# Patient Record
Sex: MALE | Race: WHITE | NOT HISPANIC OR LATINO | Employment: OTHER | URBAN - METROPOLITAN AREA
[De-identification: names, ages, dates, MRNs, and addresses within clinical notes are randomized per-mention and may not be internally consistent; named-entity substitution may affect disease eponyms.]

---

## 2017-03-03 ENCOUNTER — ALLSCRIPTS OFFICE VISIT (OUTPATIENT)
Dept: OTHER | Facility: OTHER | Age: 67
End: 2017-03-03

## 2017-05-02 ENCOUNTER — LAB REQUISITION (OUTPATIENT)
Dept: LAB | Facility: HOSPITAL | Age: 67
End: 2017-05-02
Payer: MEDICARE

## 2017-05-02 ENCOUNTER — ALLSCRIPTS OFFICE VISIT (OUTPATIENT)
Dept: OTHER | Facility: OTHER | Age: 67
End: 2017-05-02

## 2017-05-02 DIAGNOSIS — L72.9 FOLLICULAR CYST OF SKIN AND SUBCUTANEOUS TISSUE: ICD-10-CM

## 2017-05-02 PROCEDURE — 88304 TISSUE EXAM BY PATHOLOGIST: CPT | Performed by: DERMATOLOGY

## 2017-05-09 ENCOUNTER — GENERIC CONVERSION - ENCOUNTER (OUTPATIENT)
Dept: OTHER | Facility: OTHER | Age: 67
End: 2017-05-09

## 2017-05-16 ENCOUNTER — ALLSCRIPTS OFFICE VISIT (OUTPATIENT)
Dept: OTHER | Facility: OTHER | Age: 67
End: 2017-05-16

## 2017-09-07 ENCOUNTER — ALLSCRIPTS OFFICE VISIT (OUTPATIENT)
Dept: OTHER | Facility: OTHER | Age: 67
End: 2017-09-07

## 2017-10-26 NOTE — PROGRESS NOTES
Assessment  1  Seborrheic keratosis (702 19) (L82 1)   2  Screening for skin condition (V82 0) (Z13 89)   3  Rosacea (695 3) (L71 9)    Plan   · Doxycycline Hyclate 50 MG Oral Capsule; take 1 capsule daily   · MetroNIDAZOLE 0 75 % External Gel; APPLY TO ENTIRE FACE DAILY   · Follow-up visit in 6 months Evaluation and Treatment  Follow-up  Status: Complete   Done: [id number]    Discussion/Summary  Discussion Summary- [facility]:   Assessment #1: Rosacea  Care Plan:   Under good control we'll continue doxycycline daily along with the MetroGel patient to return to even try doxycycline every other day  Assessment #2: Screening for dermatologic disorders  Care Plan:   Nothing else of concern noted on complete exam sun protection recommended follow-up in 6 months  Assessment #3: Seborrheic keratosis  Care Plan:   Patient reassured these are normal growths acquire with age no treatment needed  Chief Complaint  Chief Complaint Free Text Note Form: 6 month check up  History of Present Illness  HPI: 56-year-old male presents for follow-up for rosacea and overall skin check doing well no problems noted no flareups of rosacea noted concerned regarding numerous moles because of noting several friends who developed melanoma recently      Review of Systems  Complete Male Dermatology [address] [place] [address] [address]- Est Patient:   Constitutional: Denies constitutional symptoms  Eyes: Denies eye symptoms  ENT:  denies ear symptoms, nasal symptoms, mouth or throat symptoms  Cardiovascular: Denies cardiovascular symptoms  Respiratory: Denies respiratory symptoms  Gastrointestinal: Denies gastrointestinal symptoms  Musculoskeletal: Denies musculoskeletal symptoms  Integumentary: Denies skin, hair and nail symptoms  Neurological: Denies neurologic symptoms  Psychiatric: Denies psychiatric symptoms  Endocrine: Denies endocrine symptoms  Hematologic/Lymphatic: Denies hematologic symptoms  Active Problems  1  Follicular cyst of skin (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 2) (L72 9)   2  Pruritus (698 9) (L29 9)   3  Rosacea (695 3) (L71 9)   4  Screening for skin condition (V82 0) (Z13 89)   5  Seborrheic dermatitis (690 10) (L21 9)   6  Seborrheic keratosis (702 19) (L82 1)    Past Medical History  Past Medical History Reviewed- Derm:   The past medical history was reviewed  Surgical History  1  History of Total Hip Replacement  Surgical History Reviewed [facility]- [facility]:   Surgical History reviewed      Family History  Mother    1  No pertinent family history  Family History Reviewed- Derm:   Family History was reviewed      Social History   · Former smoker (J63 59) (K27 542)  Social History Reviewed [facility]- Derm: The social history was reviewed      Current Meds   1  Alfuzosin HCl ER 10 MG Oral Tablet Extended Release 24 Hour Recorded   2  Atorvastatin Calcium 10 MG Oral Tablet Recorded   3  Calcium 5668-3983 MG-UNIT CHEW Recorded   4  Calcium 200 MG TABS Recorded   5  Celecoxib 200 MG Oral Capsule; Therapy: (Meseret Balling) to Recorded   6  Creon 70937-36513 UNIT Oral Capsule Delayed Release Particles Recorded   7  Doxycycline Hyclate 50 MG Oral Capsule; TAKE 1 CAPSULE DAILY; Therapy: 04VEU4355 to (Evaluate:66Uxi1733)  Requested for: 98LBG2591; Last   Rx:03Mar2017 Ordered   8  Doxycycline Hyclate 50 MG Oral Capsule; take 1 capsule twice a day; Therapy: 99VVK2064 to (Last Rx:10Ncx5247)  Requested for: 26DZD4516; Status:   ACTIVE - Renewal Denied Ordered   9  Ketoconazole 2 % External Shampoo; SHAMPOO HAIR/SCALP TWICE WEEKLY; Therapy: 26OYB5766 to (Abhinav Joshi)  Requested for: 67YBX9642; Last   Rx:03Mar2017 Ordered   10  Konsyl 28 3 % Oral Packet Recorded   11  Lasix 20 MG Oral Tablet; Therapy: (Recorded:03Mar2017) to Recorded   12  Lisinopril 10 MG Oral Tablet Recorded   13  Lyrica 75 MG Oral Capsule; Therapy: (Meseret Balling) to Recorded   14  MetroNIDAZOLE 0 75 % External Gel; APPLY TO ENTIRE FACE DAILY;     Therapy: 91KVO9010 to (Last Rx:03Mar2017)  Requested for: 20CKL8534 Ordered   15  Multivitamins TABS; Therapy: (Recorded:11Imb8456) to Recorded   16  Omeprazole 20 MG Oral Capsule Delayed Release Recorded   17  Testosterone Cypionate 200 MG/ML OIL Recorded   18  Viberzi TABS; Therapy: (Recorded:03Mar2017) to Recorded  Medication List Reviewed: The medication list was reviewed and updated today  Allergies  1  No Known Drug Allergies  2  Adhesive Tape    Physical Exam    Constitutional   General appearance: Appears healthy and well developed  Lymphatic   No visible disturbance  Musculoskeletal   Digits and nails: No clubbing, cyanosis or edema  Cutaneous and nail exam normal     Skin   Scalp skin texture and hair distribution: Normal skin texture on scalp, normal hair distribution  Head: Normal turgor, no rashes, no lesions  Neck: Normal turgor, no rashes, no lesions  Chest: Normal turgor, no rashes, no lesions  Abdomen: Normal turgor, no rashes, no lesions  Back: Normal turgor, no rashes, no lesions  Right upper extremity: Normal turgor, no rashes, no lesions  Left upper extremity: Normal turgor, no rashes, no lesions  Right lower extremity: Normal turgor, no rashes, no lesions  Left lower extremity: Normal turgor, no rashes, no lesions  Neuro/Psych   Alert and oriented x 3  Displays comfort and cooperation during encounterl  Affect is normal     Finding Decreased erythema on the face without papules no pustules noted this time normal keratotic papules with greasy stuck on appearance nothing else remarkable noted on complete exam       Future Appointments    Date/Time Provider Specialty Site   03/09/2018 11:15 AM ALOK Neff   Dermatology [facility]     Signatures   Electronically signed by : ALOK Cotto ; Sep  7 2017 12:47PM EST                       (Author)

## 2018-03-09 ENCOUNTER — OFFICE VISIT (OUTPATIENT)
Dept: DERMATOLOGY | Facility: CLINIC | Age: 68
End: 2018-03-09
Payer: MEDICARE

## 2018-03-09 DIAGNOSIS — Z85.828 HISTORY OF SKIN CANCER: ICD-10-CM

## 2018-03-09 DIAGNOSIS — L71.9 ROSACEA: Primary | ICD-10-CM

## 2018-03-09 DIAGNOSIS — Z13.89 SCREENING FOR SKIN CONDITION: ICD-10-CM

## 2018-03-09 DIAGNOSIS — L82.1 SEBORRHEIC KERATOSIS: ICD-10-CM

## 2018-03-09 DIAGNOSIS — L21.9 SEBORRHEIC DERMATITIS: ICD-10-CM

## 2018-03-09 PROCEDURE — 99213 OFFICE O/P EST LOW 20 MIN: CPT | Performed by: DERMATOLOGY

## 2018-03-09 RX ORDER — TESTOSTERONE CYPIONATE 200 MG/ML
INJECTION INTRAMUSCULAR
COMMUNITY

## 2018-03-09 RX ORDER — DOXYCYCLINE HYCLATE 50 MG/1
50 CAPSULE ORAL DAILY
Qty: 60 CAPSULE | Refills: 3 | Status: SHIPPED | OUTPATIENT
Start: 2018-03-09 | End: 2019-04-19 | Stop reason: SDUPTHER

## 2018-03-09 RX ORDER — SENNOSIDES 8.6 MG
650 CAPSULE ORAL EVERY 8 HOURS
COMMUNITY

## 2018-03-09 RX ORDER — LANOLIN ALCOHOL/MO/W.PET/CERES
1 CREAM (GRAM) TOPICAL
COMMUNITY

## 2018-03-09 RX ORDER — KETOCONAZOLE 20 MG/ML
1 SHAMPOO TOPICAL 2 TIMES WEEKLY
Qty: 120 ML | Refills: 5 | Status: SHIPPED | OUTPATIENT
Start: 2018-03-12 | End: 2019-05-16 | Stop reason: SDUPTHER

## 2018-03-09 RX ORDER — METRONIDAZOLE 7.5 MG/G
GEL TOPICAL DAILY
COMMUNITY
Start: 2014-07-17 | End: 2018-03-09 | Stop reason: SDUPTHER

## 2018-03-09 RX ORDER — ATORVASTATIN CALCIUM 20 MG/1
20 TABLET, FILM COATED ORAL
Refills: 0 | COMMUNITY
Start: 2018-01-24

## 2018-03-09 RX ORDER — ALFUZOSIN HYDROCHLORIDE 10 MG/1
TABLET, EXTENDED RELEASE ORAL
COMMUNITY

## 2018-03-09 RX ORDER — METRONIDAZOLE 7.5 MG/G
GEL TOPICAL DAILY
Qty: 45 G | Refills: 5 | Status: SHIPPED | OUTPATIENT
Start: 2018-03-09 | End: 2018-07-27

## 2018-03-09 RX ORDER — CELECOXIB 200 MG/1
CAPSULE ORAL
COMMUNITY

## 2018-03-09 RX ORDER — DOXYCYCLINE HYCLATE 50 MG/1
1 CAPSULE ORAL 2 TIMES DAILY
COMMUNITY
Start: 2014-05-20 | End: 2018-03-09 | Stop reason: SDUPTHER

## 2018-03-09 RX ORDER — ELUXADOLINE 100 MG/1
TABLET, FILM COATED ORAL
COMMUNITY
Start: 2018-01-23

## 2018-03-09 RX ORDER — KETOCONAZOLE 20 MG/ML
SHAMPOO TOPICAL
COMMUNITY
Start: 2014-06-26 | End: 2018-03-09 | Stop reason: SDUPTHER

## 2018-03-09 RX ORDER — FUROSEMIDE 40 MG/1
40 TABLET ORAL
COMMUNITY
Start: 2018-01-11

## 2018-03-09 RX ORDER — OMEPRAZOLE 40 MG/1
CAPSULE, DELAYED RELEASE ORAL
COMMUNITY
Start: 2017-12-29

## 2018-03-09 RX ORDER — LISINOPRIL 40 MG/1
TABLET ORAL
COMMUNITY
Start: 2018-01-23

## 2018-03-09 RX ORDER — AMOXICILLIN 500 MG/1
CAPSULE ORAL
Refills: 0 | COMMUNITY
Start: 2017-12-05

## 2018-03-09 RX ORDER — TADALAFIL 5 MG
TABLET ORAL
COMMUNITY
Start: 2018-01-03

## 2018-03-09 NOTE — PROGRESS NOTES
[address] [facility] OF [address] [facility] DERMATOLOGY  [address] [place] [address]     MRN: [id number] : 1950  Encounter: [id number]  Patient Information: Delories Goltz  Chief complaint:Six-month checkup for history of anal cancer as well as rosacea    History of present illness:  67-year-old male presents for follow-up for rosacea patient notes some increased activity with some erythema on  His face  Patient relates this to shoveling from the snow storm  No past medical history on file  No past surgical history on file  Social History   History   Alcohol use Not on file     History   Drug use: Unknown     History   Smoking Status    Not on file   Smokeless Tobacco    Not on file     No family history on file  Meds/Allergies   Allergies   Allergen Reactions    Wound Dressing Adhesive     Silver Rash       Meds:  Prior to Admission medications    Medication Sig Start Date End Date Taking?  Authorizing Provider   acetaminophen (TYLENOL) 650 mg CR tablet Take 650 mg by mouth every 8 (eight) hours   Yes Historical Provider, MD   alfuzosin (UROXATRAL) 10 mg 24 hr tablet Take by mouth   Yes Historical Provider, MD   amoxicillin (AMOXIL) 500 mg capsule take 4 capsules by mouth 1 hour prior to procedure 17  Yes Historical Provider, MD   aspirin 81 MG tablet Take 81 mg by mouth 18  Yes Historical Provider, MD   atorvastatin (LIPITOR) 20 mg tablet Take 20 mg by mouth daily at bedtime 18  Yes Historical Provider, MD   Calcium 200 MG TABS Take by mouth   Yes Historical Provider, MD   celecoxib (CeleBREX) 200 mg capsule Take by mouth   Yes Historical Provider, MD   CIALIS 5 MG tablet  1/3/18  Yes Historical Provider, MD   doxycycline hyclate (VIBRAMYCIN) 50 mg capsule Take 1 capsule by mouth 2 (two) times a day 14  Yes Historical Provider, MD   furosemide (LASIX) 40 mg tablet Take 40 mg by mouth 18  Yes Historical Provider, MD   glucosamine-chondroitin 500-400 MG tablet Take 1 tablet by mouth   Yes Historical Provider, MD   ketoconazole (NIZORAL) 2 % shampoo Apply topically 6/26/14  Yes Historical Provider, MD   lisinopril (ZESTRIL) 40 mg tablet  1/23/18  Yes Historical Provider, MD   metroNIDAZOLE (METROGEL) 0 75 % gel Apply topically daily 7/17/14  Yes Historical Provider, MD   Multiple Vitamins-Minerals (MULTIVITAMIN ADULT PO) Take by mouth   Yes Historical Provider, MD   NEEDLE, DISP, 18 G (BD DISP NEEDLES) 18G X 1-1/2" [address]  6/28/15  Yes Historical Provider, MD   NEEDLE, DISP, 22 G (BD ECLIPSE NEEDLE) 22G X 1-1/2" [address] [place] [address]  7/13/15  Yes Historical Provider, MD   omeprazole (PriLOSEC) 40 MG capsule  12/29/17  Yes Historical Provider, MD   pancrelipase, Lip-Prot-Amyl, (CREON) 24,000 units Take by mouth   Yes Historical Provider, MD   psyllium (KONSYL) 28 3 % Take by mouth   Yes Historical Provider, MD   testosterone cypionate (DEPO-TESTOSTERONE) 200 mg/mL SOLN Inject into the shoulder, thigh, or buttocks   Yes Historical Provider, MD   VIBERZI 100 MG TABS  1/23/18  Yes Historical Provider, MD       Subjective:     Review of Systems:    General: negative for - chills, fatigue, fever,  weight gain or weight loss  Psychological: negative for - anxiety, behavioral disorder, concentration difficulties, decreased libido, depression, irritability, memory difficulties, mood swings, sleep disturbances or suicidal ideation  ENT: negative for - hearing difficulties , nasal congestion, nasal discharge, oral lesions, sinus pain, sneezing, sore throat  Allergy and Immunology: negative for - hives, insect bite sensitivity,  Hematological and Lymphatic: negative for - bleeding problems, blood clots,bruising, swollen lymph nodes  Endocrine: negative for - hair pattern changes, hot flashes, malaise/lethargy, mood swings, palpitations, polydipsia/polyuria, skin changes, temperature intolerance or unexpected weight change  Respiratory: negative for - cough, hemoptysis, orthopnea, shortness of breath, or wheezing  Cardiovascular: negative for - chest pain, dyspnea on exertion, edema,  Gastrointestinal: negative for - abdominal pain, nausea/vomiting  Genito-Urinary: negative for - dysuria, incontinence, irregular/heavy menses or urinary frequency/urgency  Musculoskeletal: negative for - gait disturbance, joint pain, joint stiffness, joint swelling, muscle pain, muscular weakness  Dermatological:  As in HPI  Neurological: negative for confusion, dizziness, headaches, impaired coordination/balance, memory loss, numbness/tingling, seizures, speech problems, tremors or weakness       Objective: There were no vitals taken for this visit  Physical Exam:    General Appearance:    Alert, cooperative, no distress   Head:    Normocephalic, without obvious abnormality, atraumatic           Skin:   A full skin exam was performed including scalp, head scalp, eyes, ears, nose, lips, neck, chest, axilla, abdomen, back, buttocks, bilateral upper extremities, bilateral lower extremities, hands, feet, fingers, toes, fingernails, and toenails  Increased erythema noted on the face with scaling erythema noted on the scalp normal keratotic papules with greasy stuck on appearance nothing else atypical noted on complete exam     Assessment:     1  Screening for skin condition     2  History of skin cancer     3  Seborrheic keratosis     4  Seborrheic dermatitis     5   Rosacea           Plan:    rosacea flaring from probably sun exposure advised the importance of sun  Protection   seborrheic dermatitis still active continue same treatment with the ketoconazole shampoo  Seborrheic keratosis patient reassured these are normal growths we acquire with age no treatment needed  History of skin cancer in no recurrence nothing else atypical sunblock recommended follow-up in 6 months  Screening for dermatologic disorders nothing else of concern noted on complete exam follow-up in 6 months    Sergei Greene MD  3/9/2018,11:51 AM    Portions of the record may have been created with voice recognition software   Occasional wrong word or "sound a like" substitutions may have occurred due to the inherent limitations of voice recognition software   Read the chart carefully and recognize, using context, where substitutions have occurred

## 2018-03-09 NOTE — PATIENT INSTRUCTIONS
rosacea flaring from probably sun exposure advised the importance of sun  Protection   seborrheic dermatitis still active continue same treatment with the ketoconazole shampoo  Seborrheic keratosis patient reassured these are normal growths we acquire with age no treatment needed  History of skin cancer in no recurrence nothing else atypical sunblock recommended follow-up in 6 months  Screening for dermatologic disorders nothing else of concern noted on complete exam follow-up in 6 months

## 2018-07-16 ENCOUNTER — TELEPHONE (OUTPATIENT)
Dept: DERMATOLOGY | Facility: CLINIC | Age: 68
End: 2018-07-16

## 2018-07-27 ENCOUNTER — OFFICE VISIT (OUTPATIENT)
Dept: DERMATOLOGY | Facility: CLINIC | Age: 68
End: 2018-07-27
Payer: MEDICARE

## 2018-07-27 DIAGNOSIS — L71.9 ROSACEA: Primary | ICD-10-CM

## 2018-07-27 PROCEDURE — 99213 OFFICE O/P EST LOW 20 MIN: CPT | Performed by: DERMATOLOGY

## 2018-07-27 RX ORDER — IVERMECTIN 10 MG/G
CREAM TOPICAL DAILY
Qty: 45 G | Refills: 3 | Status: SHIPPED | OUTPATIENT
Start: 2018-07-27 | End: 2018-11-15 | Stop reason: SDUPTHER

## 2018-07-27 RX ORDER — DOXYCYCLINE 40 MG/1
40 CAPSULE ORAL EVERY MORNING
Qty: 30 CAPSULE | Refills: 3 | Status: SHIPPED | OUTPATIENT
Start: 2018-07-27 | End: 2019-04-19 | Stop reason: ALTCHOICE

## 2018-07-27 NOTE — PATIENT INSTRUCTIONS
flaring on lower dose of doxycycline will try him on or a should to see if he can tolerate this and see if this will help also switch to Soolantra cream

## 2018-07-27 NOTE — PROGRESS NOTES
[address] [facility] DERMATOLOGY  [address]  [facility] [address]  [phone]  [phone]     MRN: [id number] : 1950  Encounter: [id number]  Patient Information: Rufina Ramon  Chief complaint: rosacea recheck    History of present illness:  48-year-old male with history of rosacea presents because of recent flare up patient with history of colitis noted his colitis flared his gastroenterologist told him to try to decrease doxycycline every other day however patient  stop the doxycycline completely and flared  Patient notably has gone back to every other day but not improved  Patient has been on Metrogel for years  No past medical history on file  No past surgical history on file  Social History   History   Alcohol use Not on file     History   Drug use: Unknown     History   Smoking Status    Not on file   Smokeless Tobacco    Not on file     No family history on file  Meds/Allergies   Allergies   Allergen Reactions    Wound Dressing Adhesive     Silver Rash       Meds:  Prior to Admission medications    Medication Sig Start Date End Date Taking?  Authorizing Provider   acetaminophen (TYLENOL) 650 mg CR tablet Take 650 mg by mouth every 8 (eight) hours   Yes Historical Provider, MD   alfuzosin (UROXATRAL) 10 mg 24 hr tablet Take by mouth   Yes Historical Provider, MD   amoxicillin (AMOXIL) 500 mg capsule take 4 capsules by mouth 1 hour prior to procedure 17  Yes Historical Provider, MD   aspirin 81 MG tablet Take 81 mg by mouth 18  Yes Historical Provider, MD   atorvastatin (LIPITOR) 20 mg tablet Take 20 mg by mouth daily at bedtime 18  Yes Historical Provider, MD   Calcium 200 MG TABS Take by mouth   Yes Historical Provider, MD   celecoxib (CeleBREX) 200 mg capsule Take by mouth   Yes Historical Provider, MD   CIALIS 5 MG tablet  1/3/18  Yes Historical Provider, MD   furosemide (LASIX) 40 mg tablet Take 40 mg by mouth 18  Yes Historical Provider, MD   ketoconazole (NIZORAL) 2 % shampoo Apply 1 application topically 2 (two) times a week 3/12/18  Yes Tony Garibay MD   lisinopril (ZESTRIL) 40 mg tablet  1/23/18  Yes Historical Provider, MD   Multiple Vitamins-Minerals (MULTIVITAMIN ADULT PO) Take by mouth   Yes Historical Provider, MD   NEEDLE DISP, 18 G (BD DISP NEEDLES) 18G X 1-1/2" MISC  6/28/15  Yes Historical Provider, MD   NEEDLE, DISP, 22 G (BD ECLIPSE NEEDLE) 22G X 1-1/2" [address]  7/13/15  Yes Historical Provider, MD   omeprazole (PriLOSEC) 40 MG capsule  12/29/17  Yes Historical Provider, MD   pancrelipase, Lip-Prot-Amyl, (CREON) 24,000 units Take by mouth   Yes Historical Provider, MD   psyllium (KONSYL) 28 3 % Take by mouth   Yes Historical Provider, MD   testosterone cypionate (DEPO-TESTOSTERONE) 200 mg/mL SOLN Inject into the shoulder, thigh, or buttocks   Yes Historical Provider, MD   VIBERZI 100 MG TABS  1/23/18  Yes Historical Provider, MD   metroNIDAZOLE (METROGEL) 0 75 % gel Apply topically daily 3/9/18 7/27/18 Yes Tony Garibay MD   doxycycline (ORACEA) 40 MG capsule Take 1 capsule (40 mg total) by mouth every morning 7/27/18   Tony Garibay MD   glucosamine-chondroitin 500-400 MG tablet Take 1 tablet by mouth    Historical Provider, MD   Ivermectin (SOOLANTRA) 1 % CREA Apply topically daily 7/27/18   Tony Garibay MD       Subjective:     Review of Systems:    General: negative for - chills, fatigue, fever,  weight gain or weight loss  Psychological: negative for - anxiety, behavioral disorder, concentration difficulties, decreased libido, depression, irritability, memory difficulties, mood swings, sleep disturbances or suicidal ideation  ENT: negative for - hearing difficulties , nasal congestion, nasal discharge, oral lesions, sinus pain, sneezing, sore throat  Allergy and Immunology: negative for - hives, insect bite sensitivity,  Hematological and Lymphatic: negative for - bleeding problems, blood clots,bruising, swollen lymph nodes  Endocrine: negative for - hair pattern changes, hot flashes, malaise/lethargy, mood swings, palpitations, polydipsia/polyuria, skin changes, temperature intolerance or unexpected weight change  Respiratory: negative for - cough, hemoptysis, orthopnea, shortness of breath, or wheezing  Cardiovascular: negative for - chest pain, dyspnea on exertion, edema,  Gastrointestinal: negative for - abdominal pain, nausea/vomiting  Genito-Urinary: negative for - dysuria, incontinence, irregular/heavy menses or urinary frequency/urgency  Musculoskeletal: negative for - gait disturbance, joint pain, joint stiffness, joint swelling, muscle pain, muscular weakness  Dermatological:  As in HPI  Neurological: negative for confusion, dizziness, headaches, impaired coordination/balance, memory loss, numbness/tingling, seizures, speech problems, tremors or weakness       Objective: There were no vitals taken for this visit  Physical Exam:    General Appearance:    Alert, cooperative, no distress   Head:    Normocephalic, without obvious abnormality, atraumatic           Skin:   A full skin exam was performed including scalp, head scalp, eyes, ears, nose, lips, neck, chest, axilla, abdomen, back, buttocks, bilateral upper extremities, bilateral lower extremities, hands, feet, fingers, toes, fingernails, and toenails  Erythema papules noted on the nose     Assessment:     1   Rosacea  doxycycline (ORACEA) 40 MG capsule    Ivermectin (SOOLANTRA) 1 % CREA         Plan:   flaring on lower dose of doxycycline will try him on or a should to see if he can tolerate this and see if this will help also switch to [address] cream    Kenyon Leach MD  7/27/2018,8:55 AM    Portions of the record may have been created with voice recognition software   Occasional wrong word or "sound a like" substitutions may have occurred due to the inherent limitations of voice recognition software   Read the chart carefully and recognize, using context, where substitutions have occurred

## 2018-11-15 DIAGNOSIS — L71.9 ROSACEA: ICD-10-CM

## 2018-11-15 RX ORDER — IVERMECTIN 10 MG/G
CREAM TOPICAL DAILY
Qty: 45 G | Refills: 0 | Status: SHIPPED | OUTPATIENT
Start: 2018-11-15 | End: 2019-03-16 | Stop reason: SDUPTHER

## 2019-02-28 NOTE — RESULT NOTES
Verified Results  (1) TISSUE EXAM [id number] 03:09PM Kelli Bran Order Number: [id number]     Test Name Result Flag Reference   LAB AP CASE REPORT (Report)     Surgical Pathology Report             Case: [id number]                   Authorizing Provider: Neisha Cullen MD     Collected:      05/02/2017 1509        Pathologist:      Abdulkadir Ramos MD      Received:      05/04/2017 1033        Specimen:  Skin, Cyst/Tag/Debridement, Upper mid back   LAB AP FINAL DIAGNOSIS      A  Skin, Cyst/Tag/Debridement, Upper mid back, excision:  - Ruptured epidermal (infundibular) cyst         Interpretation performed at [facility], [address] [place] [address]  Electronically signed by Abdulkadir Ramos MD on 5/8/2017 at 6:12 PM   LAB AP SURGICAL ADDITIONAL INFORMATION (Report)     These tests were developed and their performance characteristics   determined by [facility] or [employer]  They may not be cleared or approved by the [place]  Food and   Drug Administration  The FDA has determined that such clearance or   approval is not necessary  These tests are used for clinical purposes  They should not be regarded as investigational or for research  This   laboratory has been approved by [place] 88, designated as a high-complexity   laboratory and is qualified to perform these tests  LAB AP GROSS DESCRIPTION (Report)     A  The specimen is received in formalin, labeled with the patient's name   and hospital number, and is designated upper mid back follicular cyst    The specimen consists of multiple fragments of tan-yellow soft tissue and   tan caseous material, grossly consistent with contents of a ruptured cyst,   measuring in aggregate 2 2 x 2 0 x 1 0 cm  No skin is identified grossly  Representative sections  One cassette  3 pieces      Note: The estimated total formalin fixation time based upon information   provided by the submitting clinician and the standard processing schedule   is over 72 hours   MAC   LAB AP CLINICAL INFORMATION      TW Order Number: [id number]  Follicular cyst Home

## 2019-03-16 DIAGNOSIS — L71.9 ROSACEA: ICD-10-CM

## 2019-03-18 RX ORDER — IVERMECTIN 10 MG/G
CREAM TOPICAL
Qty: 45 G | Refills: 0 | Status: SHIPPED | OUTPATIENT
Start: 2019-03-18 | End: 2019-08-11 | Stop reason: SDUPTHER

## 2019-04-19 DIAGNOSIS — L71.9 ROSACEA: ICD-10-CM

## 2019-04-19 RX ORDER — DOXYCYCLINE HYCLATE 50 MG/1
CAPSULE ORAL
Qty: 60 CAPSULE | Refills: 0 | Status: SHIPPED | OUTPATIENT
Start: 2019-04-19 | End: 2019-06-19

## 2019-05-16 DIAGNOSIS — L21.9 SEBORRHEIC DERMATITIS: ICD-10-CM

## 2019-05-16 RX ORDER — KETOCONAZOLE 20 MG/ML
SHAMPOO TOPICAL
Qty: 120 ML | Refills: 5 | Status: SHIPPED | OUTPATIENT
Start: 2019-05-16

## 2019-06-23 DIAGNOSIS — L71.9 ROSACEA: Primary | ICD-10-CM

## 2019-06-23 RX ORDER — DOXYCYCLINE HYCLATE 50 MG/1
CAPSULE ORAL
Qty: 60 CAPSULE | Refills: 0 | Status: SHIPPED | OUTPATIENT
Start: 2019-06-23 | End: 2019-08-22

## 2019-07-30 ENCOUNTER — EVALUATION (OUTPATIENT)
Dept: PHYSICAL THERAPY | Facility: CLINIC | Age: 69
End: 2019-07-30
Payer: MEDICARE

## 2019-07-30 ENCOUNTER — APPOINTMENT (OUTPATIENT)
Dept: PHYSICAL THERAPY | Facility: CLINIC | Age: 69
End: 2019-07-30
Payer: MEDICARE

## 2019-07-30 DIAGNOSIS — M25.512 ACUTE PAIN OF LEFT SHOULDER: ICD-10-CM

## 2019-07-30 DIAGNOSIS — I89.0 CHRONIC ACQUIRED LYMPHEDEMA: Primary | ICD-10-CM

## 2019-07-30 PROCEDURE — 97162 PT EVAL MOD COMPLEX 30 MIN: CPT | Performed by: PHYSICAL THERAPIST

## 2019-07-30 PROCEDURE — 97140 MANUAL THERAPY 1/> REGIONS: CPT | Performed by: PHYSICAL THERAPIST

## 2019-07-30 PROCEDURE — 97110 THERAPEUTIC EXERCISES: CPT | Performed by: PHYSICAL THERAPIST

## 2019-07-30 NOTE — LETTER
2019    Lois Bonds PA-C  [address]  [facility]    Patient: Dick Hawthorne   YOB: 1950   Date of Visit: 2019     Encounter Diagnosis     ICD-10-CM    1  Chronic acquired lymphedema I89 0    2  Acute pain of left shoulder M25 512        Dear Dr Georgiana Ivan:    Thank you for your recent referral of Dick Hawthorne  Please review the attached evaluation summary from Milad's recent visit  Please verify that you agree with the plan of care by signing the attached order  If you have any questions or concerns, please do not hesitate to call  I sincerely appreciate the opportunity to share in the care of one of your patients and hope to have another opportunity to work with you in the near future  Sincerely,    Darrius Bell, PT      Referring Provider:      I certify that I have read the below Plan of Care and certify the need for these services furnished under this plan of treatment while under my care  Lois Bonds PA-C  [address]  [address]  [facility]: [phone]          PT Evaluation     Today's date: 2019  Patient name: Dick Hawthorne  : 1950  MRN: [id number]  Referring provider: Dhara Garcia PA-C  Dx:   Encounter Diagnosis     ICD-10-CM    1  Chronic acquired lymphedema I89 0    2  Acute pain of left shoulder M25 512                   Assessment  Assessment details: Patient presents with complaints of continued knee pain 7 months p(ost TKR  He continues to have left LE lymphedema  He reports decreased knee pain with wearing compression garment all day  He reports not wanting to wear the garment in the summer with shorts  Educated on self massage starting in abdomen  He has had his garment for more than 6 months and will need a new thigh-high  Patient showed no interest in compression pump at this time  Girth measurements taken    Issued home strengthening program for his left shoulder due to complaints of pain  Patient to continue PT 1 more visit to review self-massage for edema control  Understanding of Dx/Px/POC: good   Prognosis: good    Plan  Planned therapy interventions: home exercise program and massage  Frequency: 1x week  Duration in weeks: 2        Subjective Evaluation    History of Present Illness  Mechanism of injury:  per surgeon for left TKR 7 months ago, his knee pain is caused by the lymphedema  Patient has decreased edema when wearing compression garment, but does not want to wear it during the summer  Pain  Current pain ratin  Aggravating factors: stair climbing    Treatments  Previous treatment: physical therapy  Patient Goals  Patient goals for therapy: decreased pain          Objective     Static Posture     Comments  Girth measurments taken in clinic        Flowsheet Rows      Most Recent Value   PT/OT G-Codes   Current Score  55   Projected Score  0             Precautions: LTKR      Manual              MLD 10'                                                                    Exercise Diary              HEP shoulder 15                                                                                                                                                                                                                                                                       Modalities

## 2019-07-30 NOTE — LETTER
2019    Margarita Edmonds PA-C  [address]  [email or web address]  [facility]    Patient: Divina Dewitt   YOB: 1950   Date of Visit: 2019     Encounter Diagnosis     ICD-10-CM    1  Chronic acquired lymphedema I89 0    2  Acute pain of left shoulder M25 512        Dear Dr Ki Chávez:    Thank you for your recent referral of Divina Dewitt  Please review the attached evaluation summary from Milad's recent visit  Please verify that you agree with the plan of care by signing the attached order  If you have any questions or concerns, please do not hesitate to call  I sincerely appreciate the opportunity to share in the care of one of your patients and hope to have another opportunity to work with you in the near future  Sincerely,    Jaydon Rudd, PT      Referring Provider:      I certify that I have read the below Plan of Care and certify the need for these services furnished under this plan of treatment while under my care  Margarita Edmonds PA-C  [address]  [place]  [facility]: [phone]          PT Evaluation     Today's date: 2019  Patient name: Divina Dewitt  : 1950  MRN: [id number]  Referring provider: Arlin Dykes PA-C  Dx:   Encounter Diagnosis     ICD-10-CM    1  Chronic acquired lymphedema I89 0    2  Acute pain of left shoulder M25 512                   Assessment  Assessment details: Patient presents with complaints of continued knee pain 7 months p(ost TKR  He continues to have left LE lymphedema  He reports decreased knee pain with wearing compression garment all day  He reports not wanting to wear the garment in the summer with shorts  Educated on self massage starting in abdomen  He has had his garment for more than 6 months and will need a new thigh-high  Patient showed no interest in compression pump at this time  Girth measurements taken    Issued home strengthening program for his left shoulder due to complaints of pain  Patient to continue PT 1 more visit to review self-massage for edema control  Understanding of Dx/Px/POC: good   Prognosis: good    Plan  Planned therapy interventions: home exercise program and massage  Frequency: 1x week  Duration in weeks: 2        Subjective Evaluation    History of Present Illness  Mechanism of injury:  per surgeon for left TKR 7 months ago, his knee pain is caused by the lymphedema  Patient has decreased edema when wearing compression garment, but does not want to wear it during the summer  Pain  Current pain ratin  Aggravating factors: stair climbing    Treatments  Previous treatment: physical therapy  Patient Goals  Patient goals for therapy: decreased pain          Objective     Static Posture     Comments  Girth measurments taken in clinic        Flowsheet Rows      Most Recent Value   PT/OT G-Codes   Current Score  55   Projected Score  0             Precautions: LTKR      Manual              MLD 10'                                                                    Exercise Diary              HEP shoulder 15                                                                                                                                                                                                                                                                       Modalities

## 2019-07-30 NOTE — PROGRESS NOTES
PT Evaluation     Today's date: 2019  Patient name: Dustin Mckeon  : 1950  MRN: [id number]  Referring provider: Tiago Urias PA-C  Dx:   Encounter Diagnosis     ICD-10-CM    1  Chronic acquired lymphedema I89 0    2  Acute pain of left shoulder M25 512                   Assessment  Assessment details: Patient presents with complaints of continued knee pain 7 months p(ost TKR  He continues to have left LE lymphedema  He reports decreased knee pain with wearing compression garment all day  He reports not wanting to wear the garment in the summer with shorts  Educated on self massage starting in abdomen  He has had his garment for more than 6 months and will need a new thigh-high  Patient showed no interest in compression pump at this time  Girth measurements taken  Issued home strengthening program for his left shoulder due to complaints of pain  Patient to continue PT 1 more visit to review self-massage for edema control  Understanding of Dx/Px/POC: good   Prognosis: good    Plan  Planned therapy interventions: home exercise program and massage  Frequency: 1x week  Duration in weeks: 2        Subjective Evaluation    History of Present Illness  Mechanism of injury:  per surgeon for left TKR 7 months ago, his knee pain is caused by the lymphedema  Patient has decreased edema when wearing compression garment, but does not want to wear it during the summer  Pain  Current pain ratin  Aggravating factors: stair climbing    Treatments  Previous treatment: physical therapy  Patient Goals  Patient goals for therapy: decreased pain          Objective     Static Posture     Comments  Girth measurments taken in clinic        Flowsheet Rows      Most Recent Value   PT/OT G-Codes   Current Score  55   Projected Score  0             Precautions: LTKR      Manual              MLD 10'                                                                    Exercise Diary              HEP shoulder 15 Modalities

## 2019-08-01 ENCOUNTER — TRANSCRIBE ORDERS (OUTPATIENT)
Dept: PHYSICAL THERAPY | Facility: CLINIC | Age: 69
End: 2019-08-01

## 2019-08-01 DIAGNOSIS — I89.0 CHRONIC ACQUIRED LYMPHEDEMA: Primary | ICD-10-CM

## 2019-08-06 ENCOUNTER — OFFICE VISIT (OUTPATIENT)
Dept: PHYSICAL THERAPY | Facility: CLINIC | Age: 69
End: 2019-08-06
Payer: MEDICARE

## 2019-08-06 DIAGNOSIS — I89.0 CHRONIC ACQUIRED LYMPHEDEMA: Primary | ICD-10-CM

## 2019-08-06 PROCEDURE — 97140 MANUAL THERAPY 1/> REGIONS: CPT

## 2019-08-06 NOTE — PROGRESS NOTES
Discharge Note     Today's date: 2019  Patient name: Tyler Sandoval  : 1950  MRN: [id number]  Referring provider: Armin Malcolm PA-C  Dx:   Encounter Diagnosis     ICD-10-CM    1  Chronic acquired lymphedema I89 0        Start Time: 1100  Stop Time: 1145  Total time in clinic (min): 45 minutes    Assessment: Patient returned for one more visit for patient education on self-mld  Patient deferred wanting compression pump  Patient requested three more pair of compression garments  Will order for him  Educated patient on full self-mld for patient to do at home at least once per day  Patient instructed on the importance of keeping up with wearing garments and doing self-mld to maintain lymphedema  Educated patient on wearing the garments if ever flying in airplane  Patient required a lot of cueing for proper technique for self-mld  Patient stated his leg felt better post self-mld  Instructed patient to call if he has any questions or concerns  Plan: Conferred with primary PT to discharge patient from therapy due to having all tools to manage lymphedema at home  Objective: See treatment diary below    Subjective: Patient reports he has been wearing garments again which is helping            Precautions: LTKR      Manual             MLD 10'            MLD education  39'                                                      Exercise Diary              HEP shoulder 15                                                                                                                                                                                                                                                                       Modalities

## 2019-08-09 DIAGNOSIS — L71.9 ROSACEA: ICD-10-CM

## 2019-08-11 RX ORDER — IVERMECTIN 10 MG/G
CREAM TOPICAL
Qty: 45 G | Refills: 0 | Status: SHIPPED | OUTPATIENT
Start: 2019-08-11 | End: 2019-12-05 | Stop reason: SDUPTHER

## 2019-09-05 RX ORDER — DOXYCYCLINE HYCLATE 50 MG/1
CAPSULE ORAL
Qty: 60 CAPSULE | Refills: 6 | OUTPATIENT
Start: 2019-09-05

## 2019-12-05 DIAGNOSIS — L71.9 ROSACEA: ICD-10-CM

## 2019-12-05 RX ORDER — IVERMECTIN 10 MG/G
CREAM TOPICAL
Qty: 45 G | Refills: 8 | Status: SHIPPED | OUTPATIENT
Start: 2019-12-05 | End: 2020-03-04 | Stop reason: SDUPTHER

## 2020-03-04 ENCOUNTER — OFFICE VISIT (OUTPATIENT)
Dept: DERMATOLOGY | Facility: CLINIC | Age: 70
End: 2020-03-04
Payer: COMMERCIAL

## 2020-03-04 DIAGNOSIS — L73.8 SEBACEOUS HYPERPLASIA OF FACE: ICD-10-CM

## 2020-03-04 DIAGNOSIS — L82.1 SEBORRHEIC KERATOSIS: Primary | ICD-10-CM

## 2020-03-04 DIAGNOSIS — Z85.828 HISTORY OF SKIN CANCER: ICD-10-CM

## 2020-03-04 DIAGNOSIS — L71.9 ROSACEA: ICD-10-CM

## 2020-03-04 DIAGNOSIS — Z13.89 SCREENING FOR SKIN CONDITION: ICD-10-CM

## 2020-03-04 PROCEDURE — 99213 OFFICE O/P EST LOW 20 MIN: CPT | Performed by: DERMATOLOGY

## 2020-03-04 RX ORDER — SOLIFENACIN SUCCINATE 10 MG/1
10 TABLET, FILM COATED ORAL DAILY
COMMUNITY

## 2020-03-04 RX ORDER — HYDROCHLOROTHIAZIDE 25 MG/1
25 TABLET ORAL DAILY
COMMUNITY

## 2020-03-04 RX ORDER — IVERMECTIN 10 MG/G
1 CREAM TOPICAL DAILY
Qty: 45 G | Refills: 8 | Status: SHIPPED | OUTPATIENT
Start: 2020-03-04 | End: 2021-05-17

## 2020-03-04 RX ORDER — DOXYCYCLINE HYCLATE 50 MG/1
50 CAPSULE ORAL 2 TIMES DAILY
COMMUNITY
End: 2020-09-23 | Stop reason: SDUPTHER

## 2020-03-04 NOTE — PROGRESS NOTES
[facility] DERMATOLOGY  [address] [place] [id number]  [phone]  [phone]     MRN: [id number] : 1950  Encounter: [id number]  Patient Information: Dominique Arias  Chief complaint:  Rosacea checkup and follow-up for history of skin cancer    History of present illness:  50-year-old male presents for overall skin check no specific concerns noted except for some growths he notes on his forehead patient's rosacea under good control with the use of topical ivermectin still taking doxycycline daily  No past medical history on file  No past surgical history on file  Social History   Social History     Substance and Sexual Activity   Alcohol Use Not on file     Social History     Substance and Sexual Activity   Drug Use Not on file     Social History     Tobacco Use   Smoking Status Former Smoker   Smokeless Tobacco Never Used     No family history on file  Meds/Allergies   Allergies   Allergen Reactions    Wound Dressing Adhesive     Silver Rash       Meds:  Prior to Admission medications    Medication Sig Start Date End Date Taking?  Authorizing Provider   acetaminophen (TYLENOL) 650 mg CR tablet Take 650 mg by mouth every 8 (eight) hours   Yes Historical Provider, MD   alfuzosin (UROXATRAL) 10 mg 24 hr tablet Take by mouth   Yes Historical Provider, MD   amoxicillin (AMOXIL) 500 mg capsule take 4 capsules by mouth 1 hour prior to procedure 17  Yes Historical Provider, MD   atorvastatin (LIPITOR) 20 mg tablet Take 20 mg by mouth daily at bedtime 18  Yes Historical Provider, MD   Calcium 200 MG TABS Take by mouth   Yes Historical Provider, MD   CIALIS 5 MG tablet  1/3/18  Yes Historical Provider, MD   doxycycline hyclate (VIBRAMYCIN) 50 mg capsule Take 50 mg by mouth 2 (two) times a day   Yes Historical Provider, MD   hydrochlorothiazide (HYDRODIURIL) 25 mg tablet Take 25 mg by mouth daily   Yes Historical Provider, MD   Ivermectin (SOOLANTRA) 1 % CREA APPLY TOPICALLY DAILY 12/5/19  Yes Antwon Miner MD   ketoconazole (NIZORAL) 2 % shampoo APPLY 1 APPLICATION TOPICALLY TWICE WEEKLY 5/16/19  Yes Antwon Miner MD   lisinopril (ZESTRIL) 40 mg tablet  1/23/18  Yes Historical Provider, MD   Mirabegron ER 50 MG TB24 Take by mouth   Yes Historical Provider, MD   Multiple Vitamins-Minerals (MULTIVITAMIN ADULT PO) Take by mouth   Yes Historical Provider, MD   NEEDLE DISP, 18 G (BD DISP NEEDLES) 18G X 1-1/2" MISC  6/28/15  Yes Historical Provider, MD   NEEDLE DISP, 22 G (BD ECLIPSE NEEDLE) 22G X 1-1/2" MISC  7/13/15  Yes Historical Provider, MD   omeprazole (PriLOSEC) 40 MG capsule  12/29/17  Yes Historical Provider, MD   pancrelipase, Lip-Prot-Amyl, (CREON) 24,000 units Take by mouth   Yes Historical Provider, MD   psyllium (KONSYL) 28 3 % Take by mouth   Yes Historical Provider, MD   solifenacin (VESICARE) 10 MG tablet Take 10 mg by mouth daily   Yes Historical Provider, MD   testosterone cypionate (DEPO-TESTOSTERONE) 200 mg/mL SOLN Inject into the shoulder, thigh, or buttocks   Yes Historical Provider, MD   aspirin 81 MG tablet Take 81 mg by mouth 1/11/18   Historical Provider, MD   celecoxib (CeleBREX) 200 mg capsule Take by mouth    Historical Provider, MD   furosemide (LASIX) 40 mg tablet Take 40 mg by mouth 1/11/18   Historical Provider, MD   glucosamine-chondroitin 500-400 MG tablet Take 1 tablet by mouth    Historical Provider, MD   VIBERZI 100 MG TABS  1/23/18   Historical Provider, MD       Subjective:     Review of Systems:    General: negative for - chills, fatigue, fever,  weight gain or weight loss  Psychological: negative for - anxiety, behavioral disorder, concentration difficulties, decreased libido, depression, irritability, memory difficulties, mood swings, sleep disturbances or suicidal ideation  ENT: negative for - hearing difficulties , nasal congestion, nasal discharge, oral lesions, sinus pain, sneezing, sore throat  Allergy and Immunology: negative for - hives, insect bite sensitivity,  Hematological and Lymphatic: negative for - bleeding problems, blood clots,bruising, swollen lymph nodes  Endocrine: negative for - hair pattern changes, hot flashes, malaise/lethargy, mood swings, palpitations, polydipsia/polyuria, skin changes, temperature intolerance or unexpected weight change  Respiratory: negative for - cough, hemoptysis, orthopnea, shortness of breath, or wheezing  Cardiovascular: negative for - chest pain, dyspnea on exertion, edema,  Gastrointestinal: negative for - abdominal pain, nausea/vomiting  Genito-Urinary: negative for - dysuria, incontinence, irregular/heavy menses or urinary frequency/urgency  Musculoskeletal: negative for - gait disturbance, joint pain, joint stiffness, joint swelling, muscle pain, muscular weakness  Dermatological:  As in HPI  Neurological: negative for confusion, dizziness, headaches, impaired coordination/balance, memory loss, numbness/tingling, seizures, speech problems, tremors or weakness       Objective: There were no vitals taken for this visit  Physical Exam:    General Appearance:    Alert, cooperative, no distress   Head:    Normocephalic, without obvious abnormality, atraumatic   Lymphatics:     Abdomen:    Skin:   A full skin exam was performed including scalp, head scalp, eyes, ears, nose, lips, neck, chest, axilla, abdomen, back, buttocks, bilateral upper extremities, bilateral lower extremities, hands, feet, fingers, toes, fingernails, and toenails yellowish umbilicated papules noted on the forehead no active rosacea noted normal keratotic papules greasy stuck on appearance previous sites of skin cancer well healed without recurrence nothing else atypical noted exam     Assessment:     1  Seborrheic keratosis     2  Screening for skin condition     3  History of skin cancer     4   Rosacea  Ivermectin (Soolantra) 1 % CREA   5  Sebaceous hyperplasia of face           Plan:   Sebaceous hyperplasia patient advise that these are normal growths no treatment indicated  Rosacea under good control patient to decrease his doxycycline every other day continue the topical ivermectin hopefully we get him off systemic therapy  Seborrheic keratosis patient reassured these are normal growths we acquire with age no treatment needed  History of skin cancer in no recurrence nothing else atypical sunblock recommended follow-up in 6 months  Screening for dermatologic disorders nothing else of concern noted on complete exam follow-up in 6 months    Fatmata Lyle MD  3/4/2020,11:09 AM    Portions of the record may have been created with voice recognition software   Occasional wrong word or "sound a like" substitutions may have occurred due to the inherent limitations of voice recognition software   Read the chart carefully and recognize, using context, where substitutions have occurred

## 2020-03-04 NOTE — PATIENT INSTRUCTIONS
Sebaceous hyperplasia patient advise that these are normal growths no treatment indicated  Rosacea under good control patient to decrease his doxycycline every other day continue the topical ivermectin hopefully we get him off systemic therapy  Seborrheic keratosis patient reassured these are normal growths we acquire with age no treatment needed  History of skin cancer in no recurrence nothing else atypical sunblock recommended follow-up in 6 months  Screening for dermatologic disorders nothing else of concern noted on complete exam follow-up in 6 months

## 2020-03-16 ENCOUNTER — TELEPHONE (OUTPATIENT)
Dept: DERMATOLOGY | Facility: CLINIC | Age: 70
End: 2020-03-16

## 2020-03-16 NOTE — TELEPHONE ENCOUNTER
Patient called and said that the [facility] needed a prior authorization  I called the insurance company and medication was approved  Patient notified

## 2020-06-18 ENCOUNTER — TELEPHONE (OUTPATIENT)
Dept: DERMATOLOGY | Facility: CLINIC | Age: 70
End: 2020-06-18

## 2020-09-23 ENCOUNTER — OFFICE VISIT (OUTPATIENT)
Dept: DERMATOLOGY | Facility: CLINIC | Age: 70
End: 2020-09-23
Payer: COMMERCIAL

## 2020-09-23 VITALS — TEMPERATURE: 97.8 F

## 2020-09-23 DIAGNOSIS — Z13.89 SCREENING FOR SKIN CONDITION: ICD-10-CM

## 2020-09-23 DIAGNOSIS — L71.9 ROSACEA: Primary | ICD-10-CM

## 2020-09-23 DIAGNOSIS — L82.1 SEBORRHEIC KERATOSIS: ICD-10-CM

## 2020-09-23 PROCEDURE — 99213 OFFICE O/P EST LOW 20 MIN: CPT | Performed by: DERMATOLOGY

## 2020-09-23 RX ORDER — TEMAZEPAM 15 MG/1
15 CAPSULE ORAL
COMMUNITY

## 2020-09-23 RX ORDER — ALLOPURINOL 300 MG/1
300 TABLET ORAL DAILY
COMMUNITY
Start: 2020-07-09

## 2020-09-23 RX ORDER — DOXYCYCLINE HYCLATE 50 MG/1
50 CAPSULE ORAL DAILY
Qty: 90 CAPSULE | Refills: 3 | Status: SHIPPED | OUTPATIENT
Start: 2020-09-23 | End: 2021-11-17

## 2020-09-23 RX ORDER — LOSARTAN POTASSIUM 100 MG/1
100 TABLET ORAL DAILY
COMMUNITY
Start: 2020-09-14 | End: 2022-03-14

## 2020-09-23 NOTE — PROGRESS NOTES
500 [facility] DERMATOLOGY  [address] [place] [id number]  [phone]  [phone]     MRN: [id number] : 1950  Encounter: [id number]  Patient Information: Carlette Landau  Chief complaint:  Follow-up Rosacea and six-month checkup    History of present illness:  58-year-old male presents for follow-up for rosacea patient also concerned regarding some increase lesions patient without previous history of skin cancer no other specific concerns noted  History reviewed  No pertinent past medical history  History reviewed  No pertinent surgical history  Social History   Social History     Substance and Sexual Activity   Alcohol Use None     Social History     Substance and Sexual Activity   Drug Use Not on file     Social History     Tobacco Use   Smoking Status Former Smoker   Smokeless Tobacco Never Used     History reviewed  No pertinent family history  Meds/Allergies   Allergies   Allergen Reactions    Wound Dressing Adhesive     Silver Rash       Meds:  Prior to Admission medications    Medication Sig Start Date End Date Taking?  Authorizing Provider   alfuzosin (UROXATRAL) 10 mg 24 hr tablet Take by mouth   Yes Historical Provider, MD   allopurinol (ZYLOPRIM) 300 mg tablet Take 300 mg by mouth daily 20  Yes Historical Provider, MD   atorvastatin (LIPITOR) 20 mg tablet Take 20 mg by mouth daily at bedtime 18  Yes Historical Provider, MD   Calcium 200 MG TABS Take by mouth   Yes Historical Provider, MD   CIALIS 5 MG tablet  1/3/18  Yes Historical Provider, MD   doxycycline hyclate (VIBRAMYCIN) 50 mg capsule Take 50 mg by mouth 2 (two) times a day   Yes Historical Provider, MD   hydrochlorothiazide (HYDRODIURIL) 25 mg tablet Take 25 mg by mouth daily   Yes Historical Provider, MD   Ivermectin (Soolantra) 1 % CREA Apply 1 application topically daily 3/4/20  Yes Jeremie Marie MD   ketoconazole (NIZORAL) 2 % shampoo APPLY 1 APPLICATION TOPICALLY TWICE WEEKLY 5/16/19  Yes Monika Giron MD   Mirabegron ER 50 MG TB24 Take by mouth   Yes Historical Provider, MD   Multiple Vitamins-Minerals (MULTIVITAMIN ADULT PO) Take by mouth   Yes Historical Provider, MD   omeprazole (PriLOSEC) 40 MG capsule  12/29/17  Yes Historical Provider, MD   psyllium (KONSYL) 28 3 % Take by mouth   Yes Historical Provider, MD   solifenacin (VESICARE) 10 MG tablet Take 10 mg by mouth daily   Yes Historical Provider, MD   temazepam (RESTORIL) 15 mg capsule Take 15 mg by mouth daily at bedtime as needed for sleep   Yes Historical Provider, MD   testosterone cypionate (DEPO-TESTOSTERONE) 200 mg/mL SOLN Inject into the shoulder, thigh, or buttocks   Yes Historical Provider, MD   acetaminophen (TYLENOL) 650 mg CR tablet Take 650 mg by mouth every 8 (eight) hours    Historical Provider, MD   amoxicillin (AMOXIL) 500 mg capsule take 4 capsules by mouth 1 hour prior to procedure 12/5/17   Historical Provider, MD   aspirin 81 MG tablet Take 81 mg by mouth 1/11/18   Historical Provider, MD   celecoxib (CeleBREX) 200 mg capsule Take by mouth    Historical Provider, MD   furosemide (LASIX) 40 mg tablet Take 40 mg by mouth 1/11/18   Historical Provider, MD   glucosamine-chondroitin 500-400 MG tablet Take 1 tablet by mouth    Historical Provider, MD   lisinopril (ZESTRIL) 40 mg tablet  1/23/18   Historical Provider, MD   losartan (COZAAR) 100 MG tablet Take 100 mg by mouth daily 9/14/20 9/14/21  Historical Provider, MD   NEEDLE, DISP, 18 G (BD DISP NEEDLES) 18G X 1-1/2" [address] [place] [address]  6/28/15   Historical Provider, MD   NEEDLE, DISP, 22 G (BD ECLIPSE NEEDLE) 22G X 1-1/2" [address] [place] [address]  7/13/15   Historical Provider, MD   pancrelipase, Lip-Prot-Amyl, (CREON) 24,000 units Take by mouth    Historical Provider, MD   VIBERZI 100 MG TABS  1/23/18   Historical Provider, MD       Subjective:     Review of Systems:    General: negative for - chills, fatigue, fever,  weight gain or weight loss  Psychological: negative for - anxiety, behavioral disorder, concentration difficulties, decreased libido, depression, irritability, memory difficulties, mood swings, sleep disturbances or suicidal ideation  ENT: negative for - hearing difficulties , nasal congestion, nasal discharge, oral lesions, sinus pain, sneezing, sore throat  Allergy and Immunology: negative for - hives, insect bite sensitivity,  Hematological and Lymphatic: negative for - bleeding problems, blood clots,bruising, swollen lymph nodes  Endocrine: negative for - hair pattern changes, hot flashes, malaise/lethargy, mood swings, palpitations, polydipsia/polyuria, skin changes, temperature intolerance or unexpected weight change  Respiratory: negative for - cough, hemoptysis, orthopnea, shortness of breath, or wheezing  Cardiovascular: negative for - chest pain, dyspnea on exertion, edema,  Gastrointestinal: negative for - abdominal pain, nausea/vomiting  Genito-Urinary: negative for - dysuria, incontinence, irregular/heavy menses or urinary frequency/urgency  Musculoskeletal: negative for - gait disturbance, joint pain, joint stiffness, joint swelling, muscle pain, muscular weakness  Dermatological:  As in HPI  Neurological: negative for confusion, dizziness, headaches, impaired coordination/balance, memory loss, numbness/tingling, seizures, speech problems, tremors or weakness       Objective:   Temp 97 8 °F (36 6 °C)     Physical Exam:    General Appearance:    Alert, cooperative, no distress   Head:    Normocephalic, without obvious abnormality, atraumatic           Skin:   A full skin exam was performed including scalp, head scalp, eyes, ears, nose, lips, neck, chest, axilla, abdomen, back, buttocks, bilateral upper extremities, bilateral lower extremities, hands, feet, fingers, toes, fingernails, and toenails facial erythema with papules especially on the nose normal keratotic papules greasy stuck appearance nothing else atypical noted on exam     Assessment:     1  Rosacea     2  Seborrheic keratosis     3  Screening for skin condition           Plan:   Rosacea appears more active will go ahead increase the doxycycline to daily continue with the ivermectin topically as well  Will recheck in 6 months   Seborrheic Keratosis  Patient reasurred these are normal growths we acquire with age no treatment needed  Screening for Dermatologic Disorders: Nothing else of concern noted on complete exam follow up in 1 year       Savanna Cruz MD  9/23/2020,10:13 AM    Portions of the record may have been created with voice recognition software   Occasional wrong word or "sound a like" substitutions may have occurred due to the inherent limitations of voice recognition software   Read the chart carefully and recognize, using context, where substitutions have occurred

## 2020-09-23 NOTE — PATIENT INSTRUCTIONS
Rosacea appears more active will go ahead increase the doxycycline to daily continue with the ivermectin topically as well  Will recheck in 6 months   Seborrheic Keratosis  Patient reasurred these are normal growths we acquire with age no treatment needed    Screening for Dermatologic Disorders: Nothing else of concern noted on complete exam follow up in 1 year

## 2021-05-14 DIAGNOSIS — L71.9 ROSACEA: ICD-10-CM

## 2021-05-17 DIAGNOSIS — L71.9 ROSACEA: ICD-10-CM

## 2021-05-17 RX ORDER — IVERMECTIN 10 MG/G
CREAM TOPICAL
Qty: 45 G | Refills: 6 | Status: SHIPPED | OUTPATIENT
Start: 2021-05-17 | End: 2021-05-17

## 2021-05-17 RX ORDER — AZELAIC ACID 0.15 G/G
GEL TOPICAL
Qty: 30 G | Refills: 0 | Status: SHIPPED | OUTPATIENT
Start: 2021-05-17 | End: 2021-05-18 | Stop reason: SDUPTHER

## 2021-05-18 DIAGNOSIS — L71.9 ROSACEA: ICD-10-CM

## 2021-05-18 RX ORDER — AZELAIC ACID 0.15 G/G
GEL TOPICAL
Qty: 50 G | Refills: 3 | Status: SHIPPED | OUTPATIENT
Start: 2021-05-18 | End: 2022-03-14 | Stop reason: SDUPTHER

## 2021-09-13 ENCOUNTER — OFFICE VISIT (OUTPATIENT)
Dept: DERMATOLOGY | Facility: CLINIC | Age: 71
End: 2021-09-13
Payer: COMMERCIAL

## 2021-09-13 DIAGNOSIS — Z85.828 HISTORY OF SKIN CANCER: ICD-10-CM

## 2021-09-13 DIAGNOSIS — Z13.89 SCREENING FOR SKIN CONDITION: ICD-10-CM

## 2021-09-13 DIAGNOSIS — L71.9 ROSACEA: Primary | ICD-10-CM

## 2021-09-13 DIAGNOSIS — L30.4 INTERTRIGO: ICD-10-CM

## 2021-09-13 DIAGNOSIS — L82.1 SEBORRHEIC KERATOSIS: ICD-10-CM

## 2021-09-13 PROCEDURE — 99214 OFFICE O/P EST MOD 30 MIN: CPT | Performed by: DERMATOLOGY

## 2021-09-13 RX ORDER — CLOTRIMAZOLE AND BETAMETHASONE DIPROPIONATE 10; .64 MG/G; MG/G
1 CREAM TOPICAL 2 TIMES DAILY
COMMUNITY
Start: 2021-07-27

## 2021-09-13 RX ORDER — TACROLIMUS 1 MG/G
OINTMENT TOPICAL 2 TIMES DAILY
Qty: 60 G | Refills: 3 | Status: SHIPPED | OUTPATIENT
Start: 2021-09-13

## 2021-09-13 NOTE — PROGRESS NOTES
500 [facility] DERMATOLOGY  [address] [place] [address]  Natali Doctor [place] [address]  [phone]  [phone]     MRN: [id number] : 1950  Encounter: [id number]  Patient Information: Elver Murillo  Chief complaint:  Follow-up for rosacea and rash in groin area  History of present illness:  69-year-old male presents for concerns regarding irritation itching in the groin area as well as rosacea patient notes that he had a flare up with a large pimple which he felt was infected increase his doxycycline to 2 a day and seems to have improved also concerned regarding rash in the groin area he has been using generic Lotrisone cream on the area with minimal effect no other concerns noted  History reviewed  No pertinent past medical history  History reviewed  No pertinent surgical history  Social History   Social History     Substance and Sexual Activity   Alcohol Use None     Social History     Substance and Sexual Activity   Drug Use Not on file     Social History     Tobacco Use   Smoking Status Former Smoker   Smokeless Tobacco Never Used     History reviewed  No pertinent family history  Meds/Allergies   Allergies   Allergen Reactions    Wound Dressing Adhesive     Silver Rash       Meds:  Prior to Admission medications    Medication Sig Start Date End Date Taking?  Authorizing Provider   alfuzosin (UROXATRAL) 10 mg 24 hr tablet Take by mouth   Yes Historical Provider, MD   allopurinol (ZYLOPRIM) 300 mg tablet Take 300 mg by mouth daily 20  Yes Historical Provider, MD   atorvastatin (LIPITOR) 20 mg tablet Take 20 mg by mouth daily at bedtime 18  Yes Historical Provider, MD   Azelaic Acid 15 % cream Apply to face daily 21  Yes Maria Alejandra Ge MD   Calcium 200 MG TABS Take by mouth   Yes Historical Provider, MD   doxycycline hyclate (VIBRAMYCIN) 50 mg capsule Take 1 capsule (50 mg total) by mouth daily 20 Yes Maria Alejandra Ge MD   furosemide (LASIX) 40 mg tablet Take 40 mg by mouth 1/11/18  Yes Historical Provider, MD   ketoconazole (NIZORAL) 2 % shampoo APPLY 1 APPLICATION TOPICALLY TWICE WEEKLY 5/16/19  Yes Giuliana Olvera MD   losartan (COZAAR) 100 MG tablet Take 100 mg by mouth daily 9/14/20 9/14/21 Yes Historical Provider, MD   Multiple Vitamins-Minerals (MULTIVITAMIN ADULT PO) Take by mouth   Yes Historical Provider, MD   omeprazole (PriLOSEC) 40 MG capsule  12/29/17  Yes Historical Provider, MD   psyllium (KONSYL) 28 3 % Take by mouth   Yes Historical Provider, MD   solifenacin (VESICARE) 10 MG tablet Take 10 mg by mouth daily   Yes Historical Provider, MD   temazepam (RESTORIL) 15 mg capsule Take 15 mg by mouth daily at bedtime as needed for sleep   Yes Historical Provider, MD   testosterone cypionate (DEPO-TESTOSTERONE) 200 mg/mL SOLN Inject into the shoulder, thigh, or buttocks   Yes Historical Provider, MD   acetaminophen (TYLENOL) 650 mg CR tablet Take 650 mg by mouth every 8 (eight) hours  Patient not taking: Reported on 9/13/2021    Historical Provider, MD   amoxicillin (AMOXIL) 500 mg capsule take 4 capsules by mouth 1 hour prior to procedure  Patient not taking: Reported on 9/13/2021 12/5/17   Historical Provider, MD   aspirin 81 MG tablet Take 81 mg by mouth  Patient not taking: Reported on 9/13/2021 1/11/18   Historical Provider, MD   celecoxib (CeleBREX) 200 mg capsule Take by mouth  Patient not taking: Reported on 9/13/2021    Historical Provider, MD   CIALIS 5 MG tablet  1/3/18   Historical Provider, MD   clotrimazole-betamethasone (Nancy Starlight) 1-0 05 % cream Apply 1 application topically 2 (two) times a day  Patient not taking: Reported on 9/13/2021 7/27/21   Historical Provider, MD   glucosamine-chondroitin 500-400 MG tablet Take 1 tablet by mouth  Patient not taking: Reported on 9/13/2021    Historical Provider, MD   hydrochlorothiazide (HYDRODIURIL) 25 mg tablet Take 25 mg by mouth daily  Patient not taking: Reported on 9/13/2021    Historical Provider, MD lisinopril (ZESTRIL) 40 mg tablet  1/23/18   Historical Provider, MD   Mirabegron ER 50 MG TB24 Take by mouth  Patient not taking: Reported on 9/13/2021    Historical Provider, MD   NEEDLE, DISP, 18 G (BD DISP NEEDLES) 18G X 1-1/2" [address]  6/28/15   Historical Provider, MD   NEEDLE DISP, 22 G (BD ECLIPSE NEEDLE) 22G X 1-1/2" [address]  7/13/15   Historical Provider, MD   pancrelipase, Lip-Prot-Amyl, (CREON) 24,000 units Take by mouth  Patient not taking: Reported on 9/13/2021    Historical Provider, MD   VIBERZI 100 MG TABS  1/23/18   Historical Provider, MD       Subjective:     Review of Systems:    General: negative for - chills, fatigue, fever,  weight gain or weight loss  Psychological: negative for - anxiety, behavioral disorder, concentration difficulties, decreased libido, depression, irritability, memory difficulties, mood swings, sleep disturbances or suicidal ideation  ENT: negative for - hearing difficulties , nasal congestion, nasal discharge, oral lesions, sinus pain, sneezing, sore throat  Allergy and Immunology: negative for - hives, insect bite sensitivity,  Hematological and Lymphatic: negative for - bleeding problems, blood clots,bruising, swollen lymph nodes  Endocrine: negative for - hair pattern changes, hot flashes, malaise/lethargy, mood swings, palpitations, polydipsia/polyuria, skin changes, temperature intolerance or unexpected weight change  Respiratory: negative for - cough, hemoptysis, orthopnea, shortness of breath, or wheezing  Cardiovascular: negative for - chest pain, dyspnea on exertion, edema,  Gastrointestinal: negative for - abdominal pain, nausea/vomiting  Genito-Urinary: negative for - dysuria, incontinence, irregular/heavy menses or urinary frequency/urgency  Musculoskeletal: negative for - gait disturbance, joint pain, joint stiffness, joint swelling, muscle pain, muscular weakness  Dermatological:  As in HPI  Neurological: negative for confusion, dizziness, headaches, impaired coordination/balance, memory loss, numbness/tingling, seizures, speech problems, tremors or weakness       Objective: There were no vitals taken for this visit  Physical Exam:    General Appearance:    Alert, cooperative, no distress   Head:    Normocephalic, without obvious abnormality, atraumatic           Skin:   A full skin exam was performed including scalp, head scalp, eyes, ears, nose, lips, neck, chest, axilla, abdomen, back, buttocks, bilateral upper extremities, bilateral lower extremities, hands, feet, fingers, toes, fingernails, and toenails erythema occasional papules on the nose and face overall improved at this time erythema scaling noted in the groin area no distinct rash at this point but there appears to be some atrophy  Normal keratotic papules greasy stuck on appearance previous sites skin cancer well healed without recurrence nothing else atypical noted on exam     Assessment:     1  Rosacea     2  Intertrigo     3  Seborrheic keratosis     4  Screening for skin condition     5  History of skin cancer           Plan:   Rosacea at present under control but obviously patient did have a flare up will continue same treatment at this point re-evaluate if it continues to be an issue  Intertrigo difficult at this point to see if there is any fungal process there is definitely atrophy from use of chronic steroids will go ahead and switch him over to tacrolimus ointment to the area twice daily  Seborrheic keratosis patient reassured these are normal growths we acquire with age no treatment needed  History of skin cancer in no recurrence nothing else atypical sunblock recommended follow-up in 1 year  Screening for dermatologic disorders nothing else of concern noted on complete exam follow-up in 1 year          Nessa Barber MD  9/13/2021,2:58 PM    Portions of the record may have been created with voice recognition software   Occasional wrong word or "sound a like" substitutions may have occurred due to the inherent limitations of voice recognition software   Read the chart carefully and recognize, using context, where substitutions have occurred

## 2021-11-17 DIAGNOSIS — L71.9 ROSACEA: ICD-10-CM

## 2021-11-17 RX ORDER — DOXYCYCLINE HYCLATE 50 MG/1
CAPSULE ORAL
Qty: 90 CAPSULE | Refills: 3 | Status: SHIPPED | OUTPATIENT
Start: 2021-11-17 | End: 2022-11-12

## 2022-03-14 ENCOUNTER — OFFICE VISIT (OUTPATIENT)
Dept: DERMATOLOGY | Facility: CLINIC | Age: 72
End: 2022-03-14
Payer: COMMERCIAL

## 2022-03-14 VITALS — TEMPERATURE: 98.1 F | WEIGHT: 231.4 LBS

## 2022-03-14 DIAGNOSIS — Z13.89 SCREENING FOR SKIN CONDITION: ICD-10-CM

## 2022-03-14 DIAGNOSIS — L82.1 SEBORRHEIC KERATOSIS: ICD-10-CM

## 2022-03-14 DIAGNOSIS — L71.9 ROSACEA: Primary | ICD-10-CM

## 2022-03-14 DIAGNOSIS — Z85.828 HISTORY OF SKIN CANCER: ICD-10-CM

## 2022-03-14 PROCEDURE — 99213 OFFICE O/P EST LOW 20 MIN: CPT | Performed by: DERMATOLOGY

## 2022-03-14 RX ORDER — AZELAIC ACID 0.15 G/G
GEL TOPICAL
Qty: 50 G | Refills: 3 | Status: SHIPPED | OUTPATIENT
Start: 2022-03-14

## 2022-03-14 NOTE — PROGRESS NOTES
500 [facility] DERMATOLOGY  [address] [place] [id number]  [phone]  [phone]     MRN: [id number] : 1950  Encounter: [id number]  Patient Information: Mimi Cano  Chief complaint:  Six-month checkup    History of present illness:  23-year-old male presents for overall skin check history of rosacea also history of skin cancer patient recently in the [facility]  And notes some sunburn  No specific concerns today his rosacea under good control  History reviewed  No pertinent past medical history  History reviewed  No pertinent surgical history  Social History   Social History     Substance and Sexual Activity   Alcohol Use None     Social History     Substance and Sexual Activity   Drug Use Not on file     Social History     Tobacco Use   Smoking Status Former Smoker   Smokeless Tobacco Never Used     History reviewed  No pertinent family history  Meds/Allergies   Allergies   Allergen Reactions    Wound Dressing Adhesive     Silver Rash       Meds:  Prior to Admission medications    Medication Sig Start Date End Date Taking?  Authorizing Provider   alfuzosin (UROXATRAL) 10 mg 24 hr tablet Take by mouth   Yes Historical Provider, MD   allopurinol (ZYLOPRIM) 300 mg tablet Take 300 mg by mouth daily 20  Yes Historical Provider, MD   atorvastatin (LIPITOR) 20 mg tablet Take 20 mg by mouth daily at bedtime 18  Yes Historical Provider, MD   Azelaic Acid 15 % cream Apply to face daily 21  Yes Aden Meza MD   Calcium 200 MG TABS Take by mouth   Yes Historical Provider, MD   doxycycline hyclate (VIBRAMYCIN) 50 mg capsule take 1 capsule by mouth once daily 21 Yes Aden Meza MD   furosemide (LASIX) 40 mg tablet Take 40 mg by mouth 18  Yes Historical Provider, MD   ketoconazole (NIZORAL) 2 % shampoo APPLY 1 APPLICATION TOPICALLY TWICE WEEKLY 19  Yes Aden Meza MD   losartan (COZAAR) 100 MG tablet Take 100 mg by mouth daily 9/14/20 3/14/22 Yes Historical Provider, MD   Multiple Vitamins-Minerals (MULTIVITAMIN ADULT PO) Take by mouth   Yes Historical Provider, MD   psyllium (KONSYL) 28 3 % Take by mouth   Yes Historical Provider, MD   tacrolimus (PROTOPIC) 0 1 % ointment Apply topically 2 (two) times a day To rash in groin until clear 9/13/21  Yes Kathryn Benítez MD   temazepam (RESTORIL) 15 mg capsule Take 15 mg by mouth daily at bedtime as needed for sleep   Yes Historical Provider, MD   testosterone cypionate (DEPO-TESTOSTERONE) 200 mg/mL SOLN Inject into the shoulder, thigh, or buttocks   Yes Historical Provider, MD   acetaminophen (TYLENOL) 650 mg CR tablet Take 650 mg by mouth every 8 (eight) hours  Patient not taking: Reported on 9/13/2021    Historical Provider, MD   amoxicillin (AMOXIL) 500 mg capsule take 4 capsules by mouth 1 hour prior to procedure  Patient not taking: Reported on 9/13/2021 12/5/17   Historical Provider, MD   aspirin 81 MG tablet Take 81 mg by mouth  Patient not taking: Reported on 9/13/2021 1/11/18   Historical Provider, MD   celecoxib (CeleBREX) 200 mg capsule Take by mouth  Patient not taking: Reported on 9/13/2021    Historical Provider, MD   CIALIS 5 MG tablet  1/3/18   Historical Provider, MD   clotrimazole-betamethasone (Ronelle Poplin) 1-0 05 % cream Apply 1 application topically 2 (two) times a day  Patient not taking: Reported on 9/13/2021 7/27/21   Historical Provider, MD   glucosamine-chondroitin 500-400 MG tablet Take 1 tablet by mouth  Patient not taking: Reported on 9/13/2021    Historical Provider, MD   hydrochlorothiazide (HYDRODIURIL) 25 mg tablet Take 25 mg by mouth daily  Patient not taking: Reported on 9/13/2021    Historical Provider, MD   lisinopril (ZESTRIL) 40 mg tablet  1/23/18   Historical Provider, MD   Mirabegron ER 50 MG TB24 Take by mouth  Patient not taking: Reported on 9/13/2021    Historical Provider, MD   NEEDLE, DISP, 18 G (BD DISP NEEDLES) 18G X 1-1/2" [facility] 6/28/15   Historical Provider, MD   NEEDLE DISP, 22 G (BD ECLIPSE NEEDLE) 22G X 1-1/2" [facility]  7/13/15   Historical Provider, MD   omeprazole (PriLOSEC) 40 MG capsule  12/29/17   Historical Provider, MD   pancrelipase, Lip-Prot-Amyl, (CREON) 24,000 units Take by mouth  Patient not taking: Reported on 9/13/2021    Historical Provider, MD   solifenacin (VESICARE) 10 MG tablet Take 10 mg by mouth daily  Patient not taking: Reported on 3/14/2022     Historical Provider, MD   VIBERZI 100 MG TABS  1/23/18   Historical Provider, MD       Subjective:     Review of Systems:    General: negative for - chills, fatigue, fever,  weight gain or weight loss  Psychological: negative for - anxiety, behavioral disorder, concentration difficulties, decreased libido, depression, irritability, memory difficulties, mood swings, sleep disturbances or suicidal ideation  ENT: negative for - hearing difficulties , nasal congestion, nasal discharge, oral lesions, sinus pain, sneezing, sore throat  Allergy and Immunology: negative for - hives, insect bite sensitivity,  Hematological and Lymphatic: negative for - bleeding problems, blood clots,bruising, swollen lymph nodes  Endocrine: negative for - hair pattern changes, hot flashes, malaise/lethargy, mood swings, palpitations, polydipsia/polyuria, skin changes, temperature intolerance or unexpected weight change  Respiratory: negative for - cough, hemoptysis, orthopnea, shortness of breath, or wheezing  Cardiovascular: negative for - chest pain, dyspnea on exertion, edema,  Gastrointestinal: negative for - abdominal pain, nausea/vomiting  Genito-Urinary: negative for - dysuria, incontinence, irregular/heavy menses or urinary frequency/urgency  Musculoskeletal: negative for - gait disturbance, joint pain, joint stiffness, joint swelling, muscle pain, muscular weakness  Dermatological:  As in HPI  Neurological: negative for confusion, dizziness, headaches, impaired coordination/balance, memory loss, numbness/tingling, seizures, speech problems, tremors or weakness       Objective:   Temp 98 1 °F (36 7 °C) (Temporal)   Wt 105 kg (231 lb 6 4 oz)     Physical Exam:    General Appearance:    Alert, cooperative, no distress   Head:    Normocephalic, without obvious abnormality, atraumatic           Skin:   A full skin exam was performed including scalp, head scalp, eyes, ears, nose, lips, neck, chest, axilla, abdomen, back, buttocks, bilateral upper extremities, bilateral lower extremities, hands, feet, fingers, toes, fingernails, and toenails erythema on the face from recent sun no sign of any active rosacea at this time normal keratotic papules greasy stuck appearance previous sites skin cancer well healed without recurrence     Assessment:     1  Rosacea  Azelaic Acid 15 % cream   2  Seborrheic keratosis     3  Screening for skin condition     4  History of skin cancer           Plan:   Rosacea under fair control difficult to assess at this time because of his recent sun exposure  Seborrheic keratosis patient reassured these are normal growths we acquire with age no treatment needed  History of skin cancer in no recurrence nothing else atypical sunblock recommended follow-up in 6 months  Screening for dermatologic disorders nothing else of concern noted on complete exam follow-up in 6 months    Rebeca Ndiaye MD  3/14/2022,3:57 PM    Portions of the record may have been created with voice recognition software   Occasional wrong word or "sound a like" substitutions may have occurred due to the inherent limitations of voice recognition software   Read the chart carefully and recognize, using context, where substitutions have occurred

## 2022-03-14 NOTE — PATIENT INSTRUCTIONS
Rosacea under fair control difficult to assess at this time because of his recent sun exposure  Seborrheic keratosis patient reassured these are normal growths we acquire with age no treatment needed  History of skin cancer in no recurrence nothing else atypical sunblock recommended follow-up in 6 months  Screening for dermatologic disorders nothing else of concern noted on complete exam follow-up in 6 months

## 2022-09-19 ENCOUNTER — OFFICE VISIT (OUTPATIENT)
Dept: DERMATOLOGY | Facility: CLINIC | Age: 72
End: 2022-09-19
Payer: COMMERCIAL

## 2022-09-19 VITALS — WEIGHT: 231 LBS | BODY MASS INDEX: 31.29 KG/M2 | HEIGHT: 72 IN

## 2022-09-19 DIAGNOSIS — Z13.89 SCREENING FOR SKIN CONDITION: ICD-10-CM

## 2022-09-19 DIAGNOSIS — L82.1 SEBORRHEIC KERATOSIS: Primary | ICD-10-CM

## 2022-09-19 DIAGNOSIS — Z85.828 HISTORY OF SKIN CANCER: ICD-10-CM

## 2022-09-19 DIAGNOSIS — L71.9 ROSACEA: ICD-10-CM

## 2022-09-19 PROCEDURE — 99213 OFFICE O/P EST LOW 20 MIN: CPT | Performed by: DERMATOLOGY

## 2022-09-19 RX ORDER — DOXYCYCLINE HYCLATE 50 MG/1
50 CAPSULE ORAL DAILY
Qty: 90 CAPSULE | Refills: 3 | Status: SHIPPED | OUTPATIENT
Start: 2022-09-19 | End: 2023-09-14

## 2022-09-19 RX ORDER — AZELAIC ACID 0.15 G/G
GEL TOPICAL
Qty: 50 G | Refills: 3 | Status: SHIPPED | OUTPATIENT
Start: 2022-09-19

## 2022-09-19 NOTE — PROGRESS NOTES
[address]  [address] [facility] [address]  [phone]  [phone]     MRN: [id number] : 1950  Encounter: [id number]  Patient Information: Erick Rust  Chief complaint:  Six-month checkup    History of present illness:  25-year-old male with previous noted history of both seborrheic dermatitis and rosacea presents for follow-up patient overall been doing well has more irritation around his ears with given betamethasone valerate from Dr Chuck Francis patient notes improvement no other concerns noted at this time  No past medical history on file  No past surgical history on file  Social History   Social History     Substance and Sexual Activity   Alcohol Use None     Social History     Substance and Sexual Activity   Drug Use Not on file     Social History     Tobacco Use   Smoking Status Former Smoker   Smokeless Tobacco Never Used     No family history on file  Meds/Allergies   Allergies   Allergen Reactions    Wound Dressing Adhesive     Silver Rash       Meds:  Prior to Admission medications    Medication Sig Start Date End Date Taking?  Authorizing Provider   alfuzosin (UROXATRAL) 10 mg 24 hr tablet Take by mouth   Yes Historical Provider, MD   allopurinol (ZYLOPRIM) 300 mg tablet Take 300 mg by mouth daily 20  Yes Historical Provider, MD   atorvastatin (LIPITOR) 20 mg tablet Take 20 mg by mouth daily at bedtime 18  Yes Historical Provider, MD   Azelaic Acid 15 % cream Apply to face daily 3/14/22  Yes Sergei Greene MD   Calcium 200 MG TABS Take by mouth   Yes Historical Provider, MD   doxycycline hyclate (VIBRAMYCIN) 50 mg capsule take 1 capsule by mouth once daily 21 Yes Sergei Greene MD   furosemide (LASIX) 40 mg tablet Take 40 mg by mouth 18  Yes Historical Provider, MD   ketoconazole (NIZORAL) 2 % shampoo APPLY 1 APPLICATION TOPICALLY TWICE WEEKLY 19  Yes Sergei Greene MD   Multiple Vitamins-Minerals (MULTIVITAMIN ADULT PO) Take by mouth   Yes Historical Provider, MD   omeprazole (PriLOSEC) 40 MG capsule  12/29/17  Yes Historical Provider, MD   psyllium (KONSYL) 28 3 % Take by mouth   Yes Historical Provider, MD   tacrolimus (PROTOPIC) 0 1 % ointment Apply topically 2 (two) times a day To rash in groin until clear 9/13/21  Yes Ashvin Fierro MD   temazepam (RESTORIL) 15 mg capsule Take 15 mg by mouth daily at bedtime as needed for sleep   Yes Historical Provider, MD   testosterone cypionate (DEPO-TESTOSTERONE) 200 mg/mL SOLN Inject into the shoulder, thigh, or buttocks   Yes Historical Provider, MD   acetaminophen (TYLENOL) 650 mg CR tablet Take 650 mg by mouth every 8 (eight) hours  Patient not taking: No sig reported    Historical Provider, MD   amoxicillin (AMOXIL) 500 mg capsule take 4 capsules by mouth 1 hour prior to procedure  Patient not taking: No sig reported 12/5/17   Historical Provider, MD   aspirin 81 MG tablet Take 81 mg by mouth  Patient not taking: No sig reported 1/11/18   Historical Provider, MD   celecoxib (CeleBREX) 200 mg capsule Take by mouth  Patient not taking: No sig reported    Historical Provider, MD   CIALIS 5 MG tablet  1/3/18   Historical Provider, MD   clotrimazole-betamethasone (Mardee Barter) 1-0 05 % cream Apply 1 application topically 2 (two) times a day  Patient not taking: No sig reported 7/27/21   Historical Provider, MD   glucosamine-chondroitin 500-400 MG tablet Take 1 tablet by mouth  Patient not taking: No sig reported    Historical Provider, MD   hydrochlorothiazide (HYDRODIURIL) 25 mg tablet Take 25 mg by mouth daily  Patient not taking: No sig reported    Historical Provider, MD   lisinopril (ZESTRIL) 40 mg tablet  1/23/18   Historical Provider, MD   losartan (COZAAR) 100 MG tablet Take 100 mg by mouth daily 9/14/20 3/14/22  Historical Provider, MD   Mirabegron ER 50 MG TB24 Take by mouth  Patient not taking: No sig reported    Historical Provider, MD   NEEDLE, DISP, 25 G 18G X 1-1/2" [address] [facility]  6/28/15   Historical Provider, MD   NEEDLE, DISP, 25 G 22G X 1-1/2" [address] [facility]  7/13/15   Historical Provider, MD   pancrelipase, Lip-Prot-Amyl, (CREON) 24,000 units Take by mouth  Patient not taking: No sig reported    Historical Provider, MD   solifenacin (VESICARE) 10 MG tablet Take 10 mg by mouth daily  Patient not taking: Reported on 3/14/2022     Historical Provider, MD   VIBERZI 100 MG TABS  1/23/18   Historical Provider, MD       Subjective:     Review of Systems:    General: negative for - chills, fatigue, fever,  weight gain or weight loss  Psychological: negative for - anxiety, behavioral disorder, concentration difficulties, decreased libido, depression, irritability, memory difficulties, mood swings, sleep disturbances or suicidal ideation  ENT: negative for - hearing difficulties , nasal congestion, nasal discharge, oral lesions, sinus pain, sneezing, sore throat  Allergy and Immunology: negative for - hives, insect bite sensitivity,  Hematological and Lymphatic: negative for - bleeding problems, blood clots,bruising, swollen lymph nodes  Endocrine: negative for - hair pattern changes, hot flashes, malaise/lethargy, mood swings, palpitations, polydipsia/polyuria, skin changes, temperature intolerance or unexpected weight change  Respiratory: negative for - cough, hemoptysis, orthopnea, shortness of breath, or wheezing  Cardiovascular: negative for - chest pain, dyspnea on exertion, edema,  Gastrointestinal: negative for - abdominal pain, nausea/vomiting  Genito-Urinary: negative for - dysuria, incontinence, irregular/heavy menses or urinary frequency/urgency  Musculoskeletal: negative for - gait disturbance, joint pain, joint stiffness, joint swelling, muscle pain, muscular weakness  Dermatological:  As in HPI  Neurological: negative for confusion, dizziness, headaches, impaired coordination/balance, memory loss, numbness/tingling, seizures, speech problems, tremors or weakness Objective:   Ht 6' (1 829 m)   Wt 105 kg (231 lb)   BMI 31 33 kg/m²     Physical Exam:    General Appearance:    Alert, cooperative, no distress   Head:    Normocephalic, without obvious abnormality, atraumatic           Skin:   A full skin exam was performed including scalp, head scalp, eyes, ears, nose, lips, neck, chest, axilla, abdomen, back, buttocks, bilateral upper extremities, bilateral lower extremities, hands, feet, fingers, toes, fingernails, and toenails facial erythema without any papules or pustules noted normal keratotic papules with greasy stuck on appearance nothing else atypical noted on complete exam no active seborrheic dermatitis noted at this time     Assessment:     1  Seborrheic keratosis     2  Rosacea     3  Screening for skin condition     4  History of skin cancer           Plan:   Rosacea under good control continue same therapy  Seborrheic keratosis patient reassured these are normal growths we acquire with age no treatment needed  History of skin cancer in no recurrence nothing else atypical sunblock recommended follow-up in 1 year  Screening for dermatologic disorders nothing else of concern noted on complete exam follow-up in 1 year    Lori Edwards MD  9/19/2022,3:56 PM    Portions of the record may have been created with voice recognition software   Occasional wrong word or "sound a like" substitutions may have occurred due to the inherent limitations of voice recognition software   Read the chart carefully and recognize, using context, where substitutions have occurred

## 2022-09-19 NOTE — PATIENT INSTRUCTIONS
Rosacea under good control continue same therapy  Seborrheic keratosis patient reassured these are normal growths we acquire with age no treatment needed  History of skin cancer in no recurrence nothing else atypical sunblock recommended follow-up in 1 year  Screening for dermatologic disorders nothing else of concern noted on complete exam follow-up in 1 year